# Patient Record
Sex: FEMALE | URBAN - METROPOLITAN AREA
[De-identification: names, ages, dates, MRNs, and addresses within clinical notes are randomized per-mention and may not be internally consistent; named-entity substitution may affect disease eponyms.]

---
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
## 2025-01-07 ENCOUNTER — RX ONLY (RX ONLY)
Age: 20
End: 2025-01-07

## 2025-01-07 ENCOUNTER — APPOINTMENT (OUTPATIENT)
Dept: URBAN - METROPOLITAN AREA CLINIC 93 | Facility: CLINIC | Age: 20
Setting detail: DERMATOLOGY
End: 2025-01-07

## 2025-01-07 DIAGNOSIS — L70.0 ACNE VULGARIS: ICD-10-CM | Status: INADEQUATELY CONTROLLED

## 2025-01-07 PROCEDURE — 99214 OFFICE O/P EST MOD 30 MIN: CPT

## 2025-01-07 PROCEDURE — ? PRESCRIPTION MEDICATION MANAGEMENT

## 2025-01-07 PROCEDURE — ? COUNSELING

## 2025-01-07 PROCEDURE — ? PRESCRIPTION

## 2025-01-07 RX ORDER — NORETHINDRONE ACETATE AND ETHINYL ESTRADIOL, ETHINYL ESTRADIOL AND FERROUS FUMARATE 1MG-10(24)
1 KIT ORAL ONCE DAILY
Qty: 30 | Refills: 8 | Status: ERX | COMMUNITY
Start: 2025-01-07

## 2025-01-07 RX ORDER — DAPSONE 50 MG/G
APPLY GEL TOPICAL BID
Qty: 60 | Refills: 5 | Status: ERX | COMMUNITY
Start: 2025-01-07

## 2025-01-07 RX ORDER — TRETIONIN 1 MG/G
APPLY CREAM TOPICAL ONCE DAILY
Qty: 45 | Refills: 11 | Status: ERX | COMMUNITY
Start: 2025-01-07

## 2025-01-07 RX ORDER — SELENIUM SULFIDE 25 MG/ML
APPLY LOTION TOPICAL AS DIRECTED
Qty: 118 | Refills: 11 | Status: ERX | COMMUNITY
Start: 2025-01-07

## 2025-01-07 RX ORDER — SODIUM SULFACETAMIDE 100 MG/G
APPLY LIQUID TOPICAL AS DIRECTED
Qty: 227 | Refills: 3 | Status: ERX | COMMUNITY
Start: 2025-01-07

## 2025-01-07 RX ADMIN — NORETHINDRONE ACETATE AND ETHINYL ESTRADIOL, ETHINYL ESTRADIOL AND FERROUS FUMARATE 1: KIT at 00:00

## 2025-01-07 RX ADMIN — SELENIUM SULFIDE APPLY: 25 LOTION TOPICAL at 00:00

## 2025-01-07 RX ADMIN — DAPSONE APPLY: 50 GEL TOPICAL at 00:00

## 2025-01-07 RX ADMIN — TRETIONIN APPLY: 1 CREAM TOPICAL at 00:00

## 2025-01-07 ASSESSMENT — LOCATION ZONE DERM: LOCATION ZONE: FACE

## 2025-01-07 ASSESSMENT — LOCATION SIMPLE DESCRIPTION DERM: LOCATION SIMPLE: LEFT CHEEK

## 2025-01-07 ASSESSMENT — LOCATION DETAILED DESCRIPTION DERM: LOCATION DETAILED: LEFT INFERIOR CENTRAL MALAR CHEEK

## 2025-01-07 NOTE — PROCEDURE: PRESCRIPTION MEDICATION MANAGEMENT
Detail Level: Zone
Continue Regimen: tretinoin 0.05 % topical cream: Apply a pea sized amount to face nightly after moisturizer.\\n\\ndapsone 5 % topical gel:  Apply a thin layer to pimples bid before moisturizer
Initiate Treatment: CLn hydrochlorous acid wash \\nSelenium sulfide face wash 2-3x a week\\nOCP once daily
Plan: If not improved in March, will consider isotretinoin (potentially low dose isotretinoin as this would be patient’s third course)
Render In Strict Bullet Format?: No

## 2025-03-18 ENCOUNTER — APPOINTMENT (OUTPATIENT)
Dept: URBAN - METROPOLITAN AREA CLINIC 93 | Facility: CLINIC | Age: 20
Setting detail: DERMATOLOGY
End: 2025-03-18

## 2025-03-18 DIAGNOSIS — L70.0 ACNE VULGARIS: ICD-10-CM | Status: WELL CONTROLLED

## 2025-03-18 PROCEDURE — ? PRESCRIPTION

## 2025-03-18 PROCEDURE — 98006 SYNCH AUDIO-VIDEO EST MOD 30: CPT

## 2025-03-18 PROCEDURE — ? COUNSELING

## 2025-03-18 PROCEDURE — ? PRESCRIPTION MEDICATION MANAGEMENT

## 2025-03-18 RX ORDER — DAPSONE 50 MG/G
GEL TOPICAL BID
Qty: 60 | Refills: 5 | Status: ERX

## 2025-03-18 RX ORDER — TRETIONIN 1 MG/G
CREAM TOPICAL ONCE DAILY
Qty: 45 | Refills: 11 | Status: ERX

## 2025-03-18 RX ORDER — SELENIUM SULFIDE 25 MG/ML
LOTION TOPICAL AS DIRECTED
Qty: 118 | Refills: 11 | Status: ERX

## 2025-03-18 RX ORDER — NORETHINDRONE ACETATE AND ETHINYL ESTRADIOL, ETHINYL ESTRADIOL AND FERROUS FUMARATE 1MG-10(24)
KIT ORAL ONCE DAILY
Qty: 30 | Refills: 8 | Status: ERX

## 2025-03-18 ASSESSMENT — LOCATION DETAILED DESCRIPTION DERM: LOCATION DETAILED: LEFT INFERIOR CENTRAL MALAR CHEEK

## 2025-03-18 ASSESSMENT — LOCATION SIMPLE DESCRIPTION DERM: LOCATION SIMPLE: LEFT CHEEK

## 2025-03-18 ASSESSMENT — LOCATION ZONE DERM: LOCATION ZONE: FACE

## 2025-03-18 NOTE — PROCEDURE: PRESCRIPTION MEDICATION MANAGEMENT
Detail Level: Zone
Continue Regimen: Tretinoin 0.1% topical cream: Apply a pea sized amount to face nightly after moisturizer.\\n\\ndapsone 5 % topical gel:  Apply a thin layer to pimples bid before moisturizer\\n\\nCLN hypochlorus acid wash \\n\\nSelenium sulfide face wash 2-3x a week\\n\\nOCP once daily
Plan: Well controlled with regimen. If she begins to worsen, recommended she call and can consider low dose isotretinoin.
Render In Strict Bullet Format?: No